# Patient Record
Sex: FEMALE | Race: AMERICAN INDIAN OR ALASKA NATIVE
[De-identification: names, ages, dates, MRNs, and addresses within clinical notes are randomized per-mention and may not be internally consistent; named-entity substitution may affect disease eponyms.]

---

## 2022-04-04 ENCOUNTER — HOSPITAL ENCOUNTER (EMERGENCY)
Dept: HOSPITAL 5 - ED | Age: 37
LOS: 1 days | Discharge: HOME | End: 2022-04-05
Payer: SELF-PAY

## 2022-04-04 DIAGNOSIS — Z32.02: ICD-10-CM

## 2022-04-04 DIAGNOSIS — R03.0: ICD-10-CM

## 2022-04-04 DIAGNOSIS — R41.82: ICD-10-CM

## 2022-04-04 DIAGNOSIS — F10.129: Primary | ICD-10-CM

## 2022-04-04 DIAGNOSIS — Z20.822: ICD-10-CM

## 2022-04-04 LAB
ALBUMIN SERPL-MCNC: 4.8 G/DL (ref 3.9–5)
ALT SERPL-CCNC: 51 UNITS/L (ref 7–56)
BASOPHILS # (AUTO): 0.1 K/MM3 (ref 0–0.1)
BASOPHILS NFR BLD AUTO: 1.7 % (ref 0–1.8)
BUN SERPL-MCNC: 9 MG/DL (ref 7–17)
BUN/CREAT SERPL: 10 %
CALCIUM SERPL-MCNC: 8.9 MG/DL (ref 8.4–10.2)
EOSINOPHIL # BLD AUTO: 0 K/MM3 (ref 0–0.4)
EOSINOPHIL NFR BLD AUTO: 0.7 % (ref 0–4.3)
HCT VFR BLD CALC: 40.1 % (ref 30.3–42.9)
HEMOLYSIS INDEX: 54
HGB BLD-MCNC: 14 GM/DL (ref 10.1–14.3)
LYMPHOCYTES # BLD AUTO: 1.8 K/MM3 (ref 1.2–5.4)
LYMPHOCYTES NFR BLD AUTO: 35.6 % (ref 13.4–35)
MCHC RBC AUTO-ENTMCNC: 35 % (ref 30–34)
MCV RBC AUTO: 99 FL (ref 79–97)
MONOCYTES # (AUTO): 0.5 K/MM3 (ref 0–0.8)
MONOCYTES % (AUTO): 9.3 % (ref 0–7.3)
PLATELET # BLD: 263 K/MM3 (ref 140–440)
RBC # BLD AUTO: 4.06 M/MM3 (ref 3.65–5.03)

## 2022-04-04 PROCEDURE — 76856 US EXAM PELVIC COMPLETE: CPT

## 2022-04-04 PROCEDURE — 80307 DRUG TEST PRSMV CHEM ANLYZR: CPT

## 2022-04-04 PROCEDURE — 80320 DRUG SCREEN QUANTALCOHOLS: CPT

## 2022-04-04 PROCEDURE — 70450 CT HEAD/BRAIN W/O DYE: CPT

## 2022-04-04 PROCEDURE — 81001 URINALYSIS AUTO W/SCOPE: CPT

## 2022-04-04 PROCEDURE — 84702 CHORIONIC GONADOTROPIN TEST: CPT

## 2022-04-04 PROCEDURE — U0003 INFECTIOUS AGENT DETECTION BY NUCLEIC ACID (DNA OR RNA); SEVERE ACUTE RESPIRATORY SYNDROME CORONAVIRUS 2 (SARS-COV-2) (CORONAVIRUS DISEASE [COVID-19]), AMPLIFIED PROBE TECHNIQUE, MAKING USE OF HIGH THROUGHPUT TECHNOLOGIES AS DESCRIBED BY CMS-2020-01-R: HCPCS

## 2022-04-04 PROCEDURE — 84703 CHORIONIC GONADOTROPIN ASSAY: CPT

## 2022-04-04 PROCEDURE — 99285 EMERGENCY DEPT VISIT HI MDM: CPT

## 2022-04-04 PROCEDURE — 86901 BLOOD TYPING SEROLOGIC RH(D): CPT

## 2022-04-04 PROCEDURE — 86850 RBC ANTIBODY SCREEN: CPT

## 2022-04-04 PROCEDURE — 85025 COMPLETE CBC W/AUTO DIFF WBC: CPT

## 2022-04-04 PROCEDURE — 36415 COLL VENOUS BLD VENIPUNCTURE: CPT

## 2022-04-04 PROCEDURE — 96375 TX/PRO/DX INJ NEW DRUG ADDON: CPT

## 2022-04-04 PROCEDURE — 86900 BLOOD TYPING SEROLOGIC ABO: CPT

## 2022-04-04 PROCEDURE — 96372 THER/PROPH/DIAG INJ SC/IM: CPT

## 2022-04-04 PROCEDURE — 80053 COMPREHEN METABOLIC PANEL: CPT

## 2022-04-04 PROCEDURE — G0480 DRUG TEST DEF 1-7 CLASSES: HCPCS

## 2022-04-04 PROCEDURE — 93005 ELECTROCARDIOGRAM TRACING: CPT

## 2022-04-04 PROCEDURE — 96374 THER/PROPH/DIAG INJ IV PUSH: CPT

## 2022-04-04 NOTE — CAT SCAN REPORT
CT head/brain wo con



INDICATION:

Altered mental status.



TECHNIQUE:

All CT scans at this location are performed using CT dose reduction for ALARA by means of automated e
xposure control. 



COMPARISON:

None available.



FINDINGS:

There is no acute hemorrhage, brain edema, hydrocephalus, or mass effect. The brain appears normal fo
r age.



Included paranasal sinuses are clear. Orbits appear normal.



IMPRESSION:

1. No acute findings. 



Signer Name: Scotty Winston MD 

Signed: 4/4/2022 10:59 PM

Workstation Name: [id number]

## 2022-04-04 NOTE — ULTRASOUND REPORT
US pelvic complete



INDICATION:

miscarriage.



TECHNIQUE:

Pelvic ultrasound



COMPARISON:

None available.



FINDINGS:

Uterus measures 7.0 x 4.0 x 4.9 cm and the endometrial stripe measures 3 mm. There is no retained pro
ducts of conception.



The left ovary appears normal. The right ovary was not visualized but there is no appreciable right o
varian mass.



IMPRESSION:

1. No findings to suggest retained products of conception. 

 



Signer Name: Scotty Winston MD 

Signed: 4/4/2022 11:15 PM

Workstation Name: [id number]
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
## 2022-04-05 VITALS — SYSTOLIC BLOOD PRESSURE: 148 MMHG | DIASTOLIC BLOOD PRESSURE: 97 MMHG

## 2022-04-05 LAB
BENZODIAZEPINES SCREEN,URINE: (no result)
BILIRUB UR QL STRIP: (no result)
BLOOD UR QL VISUAL: (no result)
HYALINE CASTS #/AREA URNS LPF: 1 /LPF
METHADONE SCREEN,URINE: (no result)
MUCOUS THREADS #/AREA URNS HPF: (no result) /HPF
OPIATE SCREEN,URINE: (no result)
PH UR STRIP: 5 [PH] (ref 5–7)
RBC #/AREA URNS HPF: 3 /HPF (ref 0–6)
UROBILINOGEN UR-MCNC: 2 MG/DL (ref ?–2)
WBC #/AREA URNS HPF: 2 /HPF (ref 0–6)

## 2022-04-05 NOTE — ELECTROCARDIOGRAPH REPORT
[facility]

                                       

Test Date:    2022               Test Time:    00:03:26

Pat Name:     ALINA CLEVELAND       Department:   

Patient ID:   [id number]          Room:          

Gender:       F                        Technician:   MARIAN

:          1985               Requested By: NEEL HENDERSON

Order Number: [id number]              Reading MD:   Prakash Bolton

                                 Measurements

Intervals                              Axis          

Rate:         88                       P:            68

AK:           165                      QRS:          57

QRSD:         84                       T:            83

QT:           419                                    

QTc:          508                                    

                           Interpretive Statements

Sinus rhythm

Abnrm T, consider ischemia, anterolateral lds

Prolonged QT interval

No previous ECG available for comparison

Electronically Signed On 2022 11:56:08 EDT by Prakash Bolton
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
## 2022-05-17 ENCOUNTER — HOSPITAL ENCOUNTER (EMERGENCY)
Dept: HOSPITAL 5 - ED | Age: 37
LOS: 1 days | Discharge: HOME | End: 2022-05-18
Payer: COMMERCIAL
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
## 2022-05-17 DIAGNOSIS — I10: Primary | ICD-10-CM

## 2022-05-17 DIAGNOSIS — R55: ICD-10-CM

## 2022-05-17 DIAGNOSIS — Z79.899: ICD-10-CM

## 2022-05-17 DIAGNOSIS — F10.129: ICD-10-CM

## 2022-05-17 PROCEDURE — 70450 CT HEAD/BRAIN W/O DYE: CPT

## 2022-05-17 PROCEDURE — 96375 TX/PRO/DX INJ NEW DRUG ADDON: CPT

## 2022-05-17 PROCEDURE — 72125 CT NECK SPINE W/O DYE: CPT

## 2022-05-17 PROCEDURE — 81001 URINALYSIS AUTO W/SCOPE: CPT

## 2022-05-17 PROCEDURE — 99285 EMERGENCY DEPT VISIT HI MDM: CPT

## 2022-05-17 PROCEDURE — 84702 CHORIONIC GONADOTROPIN TEST: CPT

## 2022-05-17 PROCEDURE — 82553 CREATINE MB FRACTION: CPT

## 2022-05-17 PROCEDURE — 85610 PROTHROMBIN TIME: CPT

## 2022-05-17 PROCEDURE — 84484 ASSAY OF TROPONIN QUANT: CPT

## 2022-05-17 PROCEDURE — 80320 DRUG SCREEN QUANTALCOHOLS: CPT

## 2022-05-17 PROCEDURE — 85025 COMPLETE CBC W/AUTO DIFF WBC: CPT

## 2022-05-17 PROCEDURE — 71045 X-RAY EXAM CHEST 1 VIEW: CPT

## 2022-05-17 PROCEDURE — 80307 DRUG TEST PRSMV CHEM ANLYZR: CPT

## 2022-05-17 PROCEDURE — 93005 ELECTROCARDIOGRAM TRACING: CPT

## 2022-05-17 PROCEDURE — 36415 COLL VENOUS BLD VENIPUNCTURE: CPT

## 2022-05-17 PROCEDURE — 96365 THER/PROPH/DIAG IV INF INIT: CPT

## 2022-05-17 PROCEDURE — 96361 HYDRATE IV INFUSION ADD-ON: CPT

## 2022-05-17 PROCEDURE — 80053 COMPREHEN METABOLIC PANEL: CPT

## 2022-05-17 PROCEDURE — 82550 ASSAY OF CK (CPK): CPT

## 2022-05-17 PROCEDURE — G0480 DRUG TEST DEF 1-7 CLASSES: HCPCS

## 2022-05-17 PROCEDURE — 96366 THER/PROPH/DIAG IV INF ADDON: CPT

## 2022-05-17 PROCEDURE — 83735 ASSAY OF MAGNESIUM: CPT

## 2022-05-18 VITALS — SYSTOLIC BLOOD PRESSURE: 162 MMHG | DIASTOLIC BLOOD PRESSURE: 95 MMHG

## 2022-05-18 LAB
ALBUMIN SERPL-MCNC: 4.8 G/DL (ref 3.9–5)
ALT SERPL-CCNC: 80 UNITS/L (ref 7–56)
BASOPHILS # (AUTO): 0 K/MM3 (ref 0–0.1)
BASOPHILS NFR BLD AUTO: 0.5 % (ref 0–1.8)
BILIRUB UR QL STRIP: (no result)
BLOOD UR QL VISUAL: (no result)
BUN SERPL-MCNC: 10 MG/DL (ref 7–17)
BUN/CREAT SERPL: 10 %
CALCIUM SERPL-MCNC: 9.6 MG/DL (ref 8.4–10.2)
EOSINOPHIL # BLD AUTO: 0.1 K/MM3 (ref 0–0.4)
EOSINOPHIL NFR BLD AUTO: 2 % (ref 0–4.3)
HCT VFR BLD CALC: 40.3 % (ref 30.3–42.9)
HEMOLYSIS INDEX: 10
HGB BLD-MCNC: 13.7 GM/DL (ref 10.1–14.3)
INR PPP: 0.9 (ref 0.87–1.13)
LYMPHOCYTES # BLD AUTO: 1.6 K/MM3 (ref 1.2–5.4)
LYMPHOCYTES NFR BLD AUTO: 42.6 % (ref 13.4–35)
MCHC RBC AUTO-ENTMCNC: 34 % (ref 30–34)
MCV RBC AUTO: 98 FL (ref 79–97)
MONOCYTES # (AUTO): 0.4 K/MM3 (ref 0–0.8)
MONOCYTES % (AUTO): 9.2 % (ref 0–7.3)
MUCOUS THREADS #/AREA URNS HPF: (no result) /HPF
PH UR STRIP: 5 [PH] (ref 5–7)
PLATELET # BLD: 234 K/MM3 (ref 140–440)
RBC # BLD AUTO: 4.1 M/MM3 (ref 3.65–5.03)
RBC #/AREA URNS HPF: 1 /HPF (ref 0–6)
UROBILINOGEN UR-MCNC: < 2 MG/DL (ref ?–2)
WBC #/AREA URNS HPF: 1 /HPF (ref 0–6)

## 2022-05-18 NOTE — CAT SCAN REPORT
CT CERVICAL SPINE WITHOUT CONTRAST



INDICATION / CLINICAL INFORMATION: Syncope, Hypertension, E.T.O.H. Intoxication.



TECHNIQUE: Axial CT images were obtained through the cervical spine. Sagittal and coronal reformatted
 images were produced. All CT scans at this location are performed using CT dose reduction for ALARA 
by means of automated exposure control. 



COMPARISON: None available.



FINDINGS: 



Alignment:  Normal. No acute subluxation.

Geographic Bone Lesion:  None present.

Fracture:  No acute fracture.

Degenerative Changes: No significant abnormality

Epidural Hematoma:  Not present.

Prevertebral / Paraspinal Soft Tissues:  Unremarkable.





IMPRESSION:  

No acute osseous findings in the cervical spine.





Signer Name: Remberto Ramachandran MD 

Signed: 5/18/2022 1:10 AM

Workstation Name: [email or web address]

## 2022-05-18 NOTE — XRAY REPORT
XR chest 1V ap



INDICATION / CLINICAL INFORMATION: syncope,htn.



COMPARISON: None available.



FINDINGS:



SUPPORT DEVICES: None.

HEART /PULMONARY VASCULATURE: No significant abnormality. 

LUNGS / PLEURA: No acute pulmonary or pleural abnormality. No pneumothorax. 



ADDITIONAL FINDINGS: No significant additional findings.



IMPRESSION:

1. No acute findings.



Signer Name: Remberto Ramachandran MD 

Signed: 5/18/2022 2:35 AM

Workstation Name: [email or web address]

## 2022-05-18 NOTE — CAT SCAN REPORT
CT HEAD WITHOUT CONTRAST



INDICATION / CLINICAL INFORMATION: syncope, htn, etoh intox.



TECHNIQUE: All CT scans at this location are performed using CT dose reduction for ALARA by means of 
automated exposure control. 



COMPARISON: CT from 4/4/2022.



FINDINGS:

BRAIN PARENCHYMA: No acute intracranial hemorrhage. No evidence of recent infarct. No mass effect or 
midline shift.

VENTRICULAR SYSTEM/EXTRA-AXIAL SPACES: Ventricles are normal for age. No extra-axial fluid collection
. 

ORBITS: Normal as visualized.

SKELETAL SYSTEM/SOFT TISSUES: Normal bones and soft tissues.

PARANASAL SINUSES/MASTOID AIR CELLS: No significant abnormality.



ADDITIONAL FINDINGS: None.



IMPRESSION:

1. No acute intracranial abnormality.



Signer Name: Remberto Ramachandran MD 

Signed: 5/18/2022 1:07 AM

Workstation Name: [email or web address]

## 2022-05-18 NOTE — EMERGENCY DEPARTMENT REPORT
<TOSHIA RAMIREZ - Last Filed: 05/18/22 06:08>





ED Syncope HPI





- General


Chief Complaint: Dizziness


Stated Complaint: DIZZINESS


Time Seen by Provider: 05/17/22 23:53


Source: patient


Exam Limitations: no limitations





- History of Present Illness


Initial Comments: 





36-year-old female the past medical history of hypertension diagnosed November 2021 presents to the hospital complaining of syncopal episode after drinking 

alcohol this evening.  Patient states she was hanging out with a friend and had 

2 shots of Karey.  She then went home and was mopping her kitchen floor when 

she passed out and woke up on the floor.  After waking she had repeated episodes

of vomiting.  She denies headache, chest pain, shortness of breath, abdominal 

pain, infectious symptoms or fever.  As per medical record review patient was 

here in April with acute alcohol intoxication and requiring mental health 

evaluation.  Patient denies daily alcohol use/abuse and states that during that 

ED visit she was taking with the same girl who was a bad influence.  Patient 

states she has not been prescribed hypertensive medication since her initial 

diagnosis in November and was told to control her blood pressure with "diet"





- Related Data


Allergies/Adverse Reactions: 


Allergies





No Known Allergies Allergy (Verified 04/04/22 21:33)


   








Home Medications: 


Ambulatory Orders





Amlodipine Besylate [Norvasc] 10 mg PO DAILY #30 tab 05/18/22 











ED Review of Systems


Comment: All other systems reviewed and negative





ED Past Medical Hx





- Past Medical History


Hx Hypertension: Yes (November 2021)





- Social History


Smoking Status: Never Smoker


Substance Use Type: None





- Medications


Home Medications: 


                                Home Medications











 Medication  Instructions  Recorded  Confirmed  Last Taken  Type


 


Amlodipine Besylate [Norvasc] 10 mg PO DAILY #30 tab 05/18/22  Unknown Rx














ED Physical Exam





- General


Limitations: No Limitations





- Other


Other exam information: 





General: No acute distress


Head: Atraumatic


Eyes: normal appearance


ENT: Moist mucous membranes


Neck: Normal appearance, no midline tenderness


Chest: Clear to auscultation bilaterally


CV: Regular rate and rhythm


Abdomen: Soft, normal bowel sounds, nontender, nondistended, no rebound or 

guarding


Back: Normal inspection


Extremity: Normal inspection, full range of motion


Neuro: Alert O x 3, no facial asymmetry, speech clear, no gross motor sensory 

deficit


Psych: Appropriate behavior


Skin: No rash





ED Medical Decision Making





- Lab Data


Result diagrams: 


                                 05/18/22 00:26





                                 05/18/22 00:26





- EKG Data


-: EKG Interpreted by Me


EKG shows normal: sinus rhythm, ST-T waves (No stemi)


Rate: normal (99)





- Radiology Data


Radiology results: report reviewed





CT HEAD WITHOUT CONTRAST  


 


 INDICATION / CLINICAL INFORMATION: syncope, htn, etoh intox.  


 


 TECHNIQUE: All CT scans at this location are performed using CT dose reduction 

for ALARA by means 


of automated exposure control.   


 


 COMPARISON: CT from 4/4/2022.  


 


 FINDINGS:  


 BRAIN PARENCHYMA: No acute intracranial hemorrhage. No evidence of recent 

infarct. No mass effect 


or midline shift.  


 VENTRICULAR SYSTEM/EXTRA-AXIAL SPACES: Ventricles are normal for age. No extra-

axial fluid 


collection.   


 ORBITS: Normal as visualized.  


 SKELETAL SYSTEM/SOFT TISSUES: Normal bones and soft tissues.  


 PARANASAL SINUSES/MASTOID AIR CELLS: No significant abnormality.  


 


 ADDITIONAL FINDINGS: None.  


 


 IMPRESSION:  


 1. No acute intracranial abnormality.  


 


CT CERVICAL SPINE WITHOUT CONTRAST  


 


 INDICATION / CLINICAL INFORMATION: Syncope, Hypertension, E.T.O.H. 

Intoxication.  


 


 TECHNIQUE: Axial CT images were obtained through the cervical spine. Sagittal 

and coronal 


reformatted images were produced. All CT scans at this location are performed 

using CT dose 


reduction for ALARA by means of automated exposure control.   


 


 COMPARISON: None available.  


 


 FINDINGS:   


 


 Alignment:  Normal. No acute subluxation.  


 Geographic Bone Lesion:  None present.  


 Fracture:  No acute fracture.  


 Degenerative Changes: No significant abnormality  


 Epidural Hematoma:  Not present.  


 Prevertebral / Paraspinal Soft Tissues:  Unremarkable.  


 


 


 IMPRESSION:    


 No acute osseous findings in the cervical spine.  








XR chest 1V ap  


 


 INDICATION / CLINICAL INFORMATION: syncope,htn.  


 


 COMPARISON: None available.  


 


 FINDINGS:  


 


 SUPPORT DEVICES: None.  


 HEART /PULMONARY VASCULATURE: No significant abnormality.   


 LUNGS / PLEURA: No acute pulmonary or pleural abnormality. No pneumothorax.   


 


 ADDITIONAL FINDINGS: No significant additional findings.  


 


 IMPRESSION:  


 1. No acute findings.  


 





- Medical Decision Making





36-year-old female presents to the hospital with syncopal episode after drinking

alcohol.  She is extremely intoxicated with elevated alcohol level .37.  Other 

ED ED work-up including imaging unremarkable.  Patient presented with elevated 

blood pressure similar to previous visit is not currently on blood pressure 

medication.  After labetalol IV there was no improvement.  Patient was treated 

with clonidine 0.2 mg with further improvement in blood pressure.  At time of 

disposition patient is still intoxicated and resting.  Still awaiting urine 

output after receiving a banana bag.  Additional liter of normal saline ordered.

 Patient signed out to my colleague Dr. Holliday to reassess for patient return to

baseline mental status and discharge home with prescribed antihypertensive 

medication


Critical Care Time: No





ED Disposition


Clinical Impression: 


 Uncontrolled hypertension





Syncope


Qualifiers:


 Syncope type: unspecified Qualified Code(s): R55 - Syncope and collapse





Acute alcohol intoxication


Qualifiers:


 Complication of substance-induced condition: with unspecified complication 

Qualified Code(s): F10.929 - Alcohol use, unspecified with intoxication, 

unspecified





Disposition: 01 HOME / SELF CARE / HOMELESS


Condition: Stable


Instructions:  Alcohol Intoxication, Easy-to-Read, Hypertension, Adult, 

Easy-to-Read, Syncope, Easy-to-Read, Syncope (ED), Hypertension (ED)


Additional Instructions: 


Take the medication as prescribed.  Follow-up with your doctor or doctor/clinic 

provided.  Return if symptoms worsen as indicated by your discharge 

instructions.


Prescriptions: 


Amlodipine Besylate [Norvasc] 10 mg PO DAILY #30 tab


Referrals: 


CARLEY NELSON MD [Primary Care Provider] - 3-5 Days


[facility] [Provider Group] - 3-5 Days





<MOSES HOLLIDAY - Last Filed: 05/18/22 11:07>





ED Review of Systems


ROS: 


Stated complaint: DIZZINESS


Other details as noted in HPI








ED Course





                                   Vital Signs











  05/17/22 05/18/22 05/18/22





  23:47 00:46 01:00


 


Temperature 99.1 F  


 


Pulse Rate 114 H  101 H


 


Respiratory 16  27 H





Rate   


 


Blood Pressure   


 


Blood Pressure 205/104  





[Right]   


 


O2 Sat by Pulse 98 97 96





Oximetry   














  05/18/22 05/18/22 05/18/22





  01:04 01:07 01:16


 


Temperature  98.0 F 


 


Pulse Rate 97 H 98 H 


 


Respiratory  16 15





Rate   


 


Blood Pressure 180/125 180/125 168/102


 


Blood Pressure  180/125 





[Right]   


 


O2 Sat by Pulse  97 94





Oximetry   














  05/18/22 05/18/22 05/18/22





  01:30 01:46 02:00


 


Temperature   


 


Pulse Rate   


 


Respiratory 22 16 16





Rate   


 


Blood Pressure 191/122 191/122 193/109


 


Blood Pressure   





[Right]   


 


O2 Sat by Pulse 95 96 96





Oximetry   














  05/18/22 05/18/22 05/18/22





  02:07 02:08 02:16


 


Temperature   


 


Pulse Rate 82  


 


Respiratory 16 16 13





Rate   


 


Blood Pressure   193/109


 


Blood Pressure 193/109  





[Right]   


 


O2 Sat by Pulse 100 100 96





Oximetry   














  05/18/22 05/18/22 05/18/22





  02:30 02:46 03:00


 


Temperature   


 


Pulse Rate   


 


Respiratory 15 17 14





Rate   


 


Blood Pressure 193/109 193/109 153/87


 


Blood Pressure   





[Right]   


 


O2 Sat by Pulse 96 96 96





Oximetry   














  05/18/22 05/18/22 05/18/22





  03:16 03:22 03:30


 


Temperature  98.1 F 


 


Pulse Rate  76 


 


Respiratory 15 15 18





Rate   


 


Blood Pressure 153/87  153/87


 


Blood Pressure  153/87 





[Right]   


 


O2 Sat by Pulse 96 97 96





Oximetry   














  05/18/22 05/18/22 05/18/22





  03:46 04:00 04:16


 


Temperature   


 


Pulse Rate   


 


Respiratory 17 17 21





Rate   


 


Blood Pressure 153/87 136/79 136/79


 


Blood Pressure   





[Right]   


 


O2 Sat by Pulse 97 95 97





Oximetry   














  05/18/22 05/18/22 05/18/22





  04:30 04:46 05:00


 


Temperature   


 


Pulse Rate   


 


Respiratory 17 15 14





Rate   


 


Blood Pressure 136/79 136/79 141/94


 


Blood Pressure   





[Right]   


 


O2 Sat by Pulse 98 97 97





Oximetry   














  05/18/22 05/18/22 05/18/22





  05:16 05:30 05:46


 


Temperature   


 


Pulse Rate   


 


Respiratory 15 17 16





Rate   


 


Blood Pressure 141/94 141/94 141/94


 


Blood Pressure   





[Right]   


 


O2 Sat by Pulse 97 96 98





Oximetry   














  05/18/22 05/18/22 05/18/22





  05:51 06:00 06:16


 


Temperature 98.4 F  


 


Pulse Rate 88  


 


Respiratory 15 16 17





Rate   


 


Blood Pressure  139/84 139/84


 


Blood Pressure 137/85  





[Right]   


 


O2 Sat by Pulse 98  





Oximetry   














  05/18/22 05/18/22 05/18/22





  06:30 06:46 07:00


 


Temperature   


 


Pulse Rate   


 


Respiratory 14 15 17





Rate   


 


Blood Pressure 139/84 139/84 160/105


 


Blood Pressure   





[Right]   


 


O2 Sat by Pulse   





Oximetry   














  05/18/22 05/18/22 05/18/22





  07:16 07:30 07:46


 


Temperature   


 


Pulse Rate   


 


Respiratory 17 15 13





Rate   


 


Blood Pressure 160/105 160/105 160/105


 


Blood Pressure   





[Right]   


 


O2 Sat by Pulse   





Oximetry   














  05/18/22 05/18/22 05/18/22





  08:00 08:16 08:30


 


Temperature   


 


Pulse Rate   


 


Respiratory 16 13 14





Rate   


 


Blood Pressure 192/124 192/124 192/124


 


Blood Pressure   





[Right]   


 


O2 Sat by Pulse   





Oximetry   














  05/18/22 05/18/22





  08:53 09:50


 


Temperature  


 


Pulse Rate  


 


Respiratory  





Rate  


 


Blood Pressure 163/100 154/102


 


Blood Pressure  





[Right]  


 


O2 Sat by Pulse  





Oximetry  














- Reevaluation(s)


Reevaluation #1: 





05/18/22 11:03


Was asked to follow up on this patient UA before discharge as her paperwork was 

prepared ready to go by Dr Ramirez--urinalysis resulted around 11:02 AM and noted 

to be within normal limits with no abnormality at this point.  I do not believe 

that this patient needs to be discharged home on antibiotics for urinary tract 

infection so we will let patient go home as initially planned by Dr. Ramirez. 





ED Medical Decision Making





- Lab Data


Result diagrams: 


                                 05/18/22 00:26





                                 05/18/22 00:26


Critical care attestation.: 


If time is entered above; I have spent that time in minutes in the direct care 

of this critically ill patient, excluding procedure time.








ED Disposition


Is pt being admited?: No


Does the pt Need Aspirin: No

## 2022-05-18 NOTE — ELECTROCARDIOGRAPH REPORT
[facility]

                                       

Test Date:    2022               Test Time:    00:29:41

Pat Name:     ALINA CLEVELAND       Department:   

Patient ID:   [id number]          Room:          

Gender:       F                        Technician:   TECH

:          1985               Requested By: TOSHIA DOCKERY

Order Number: [id number]              Reading MD:   Prakash Bolton

                                 Measurements

Intervals                              Axis          

Rate:         99                       P:            66

SC:           151                      QRS:          23

QRSD:         85                       T:            74

QT:           371                                    

QTc:          476                                    

                           Interpretive Statements

Sinus rhythm

Probable left atrial enlargement

Compared to ECG 2022 00:03:26

No significant change noted.

Electronically Signed On 2022 11:43:27 EDT by Prakash Bolton